# Patient Record
Sex: MALE | Race: WHITE | NOT HISPANIC OR LATINO | URBAN - METROPOLITAN AREA
[De-identification: names, ages, dates, MRNs, and addresses within clinical notes are randomized per-mention and may not be internally consistent; named-entity substitution may affect disease eponyms.]

---

## 2023-09-13 ENCOUNTER — LAB OUTSIDE AN ENCOUNTER (OUTPATIENT)
Dept: URBAN - METROPOLITAN AREA CLINIC 118 | Facility: CLINIC | Age: 58
End: 2023-09-13

## 2023-09-13 ENCOUNTER — OFFICE VISIT (OUTPATIENT)
Dept: URBAN - METROPOLITAN AREA CLINIC 118 | Facility: CLINIC | Age: 58
End: 2023-09-13
Payer: COMMERCIAL

## 2023-09-13 ENCOUNTER — DASHBOARD ENCOUNTERS (OUTPATIENT)
Age: 58
End: 2023-09-13

## 2023-09-13 VITALS
TEMPERATURE: 97 F | DIASTOLIC BLOOD PRESSURE: 87 MMHG | HEIGHT: 70 IN | WEIGHT: 315 LBS | SYSTOLIC BLOOD PRESSURE: 143 MMHG | BODY MASS INDEX: 45.1 KG/M2 | HEART RATE: 71 BPM

## 2023-09-13 DIAGNOSIS — R19.7 DIARRHEA OF PRESUMED INFECTIOUS ORIGIN: ICD-10-CM

## 2023-09-13 DIAGNOSIS — A08.4 GASTROENTERITIS AND COLITIS, VIRAL: ICD-10-CM

## 2023-09-13 DIAGNOSIS — K62.5 RECTAL BLEEDING: ICD-10-CM

## 2023-09-13 DIAGNOSIS — Z12.11 COLON CANCER SCREENING: ICD-10-CM

## 2023-09-13 DIAGNOSIS — K57.90 DIVERTICULOSIS: ICD-10-CM

## 2023-09-13 PROBLEM — 397881000: Status: ACTIVE | Noted: 2023-09-13

## 2023-09-13 PROCEDURE — 99203 OFFICE O/P NEW LOW 30 MIN: CPT | Performed by: INTERNAL MEDICINE

## 2023-09-13 RX ORDER — POTASSIUM CHLORIDE 750 MG/1
TABLET, FILM COATED, EXTENDED RELEASE ORAL
Qty: 90 TABLET | Status: ACTIVE | COMMUNITY

## 2023-09-13 RX ORDER — OLMESARTAN MEDOXOMIL 40 MG/1
TABLET, FILM COATED ORAL
Qty: 90 TABLET | Status: ACTIVE | COMMUNITY

## 2023-09-13 RX ORDER — MELOXICAM 15 MG/1
TABLET ORAL
Qty: 30 TABLET | Status: ACTIVE | COMMUNITY

## 2023-09-13 RX ORDER — CHLORTHALIDONE 25 MG/1
TABLET ORAL
Qty: 90 TABLET | Status: ACTIVE | COMMUNITY

## 2023-09-13 NOTE — HPI-TODAY'S VISIT:
This is a 56 yo male with pmh of HTN and b/l knee replacement here for evaluation for diarrhea, rectal bleeding.   Sunday last week, noticed bright red blood in the stools with formed stools. This is after having what appeared to be undercooked burger at [employer]. He felt more ill yesterday with abdominal discomfort and nausea along with fever. Went to the [facility] ED and had CT a/p, which showed gallstones without signs of acute cholecystitis and diverticulosis. Afterwards, he began having multiple watery loose stools even at night having to go to the bathroom. Reports soreness in the lower abdomen. Feels diarrhea has slightly improved today. No additional bleeding episodes or fever.   No prior symptoms of abdominal pain, diarrhea, constipation, or GI bleeding. No prior antibiotic use. No prior colonoscopy.   CT a/p 9/12/2023.  FINDINGS:      Lung bases are clear. No acute osseous abnormalities. L5-S1 grade 1 spondylolisthesis and bilateral L5 spondylolysis.   The liver, spleen, pancreas, adrenal glands are unremarkable. Cholelithiasis without CT evidence of acute cholecystitis.   No hydronephrosis. No nephrolithiasis or ureteral stone.   No bowel dilation. Distal colon diverticulosis without diverticulitis. Appendix is normal.   Pelvic organs are unremarkable.      IMPRESSION:   Cholelithiasis without CT evidence of acute cholecystitis.

## 2023-09-16 LAB
CAMPYLOBACTER GROUP: NOT DETECTED
NOROVIRUS GI/GII: DETECTED
ROTAVIRUS A: NOT DETECTED
SALMONELLA SPECIES: NOT DETECTED
SHIGA TOXIN 1: NOT DETECTED
SHIGA TOXIN 2: NOT DETECTED
SHIGELLA SPECIES: NOT DETECTED
VIBRIO GROUP: NOT DETECTED
YERSINIA ENTEROCOLITICA: NOT DETECTED